# Patient Record
(demographics unavailable — no encounter records)

---

## 2025-04-29 NOTE — IMAGING
[de-identified] : left hand skin intact thumb MP joint + swelling and ecchymosis TTP base of thumb P1 ROM deferred  NVID  Xray left thumb 3 views taken today: extra articular proximal phalanx base fracture

## 2025-04-29 NOTE — HISTORY OF PRESENT ILLNESS
[Dull/Aching] : dull/aching [Localized] : localized [Sharp] : sharp [Student] : Work status: student [de-identified] : 04/29/2025 :RAVEN OBRIEN , a 10 year old female, presents today for left thumb pain injured at school today while playing dodgeball during gym   RHD NKDA No PMHX, No Meds

## 2025-04-29 NOTE — ASSESSMENT
[FreeTextEntry1] : The patient was advised of the diagnosis. The natural history of the pathology was explained in full to the patient in layman's terms. All questions were answered. The risks and benefits of surgical and non-surgical treatment alternatives were explained in full to the patient.  OT rx provided to patient for custom hand based thumb spica brace.  RTO 4 weeks for repeat x-rays.

## 2025-05-28 NOTE — IMAGING
[de-identified] : left hand skin intact thumb MP joint resolved swelling and ecchymosis no TTP base of thumb P1 ROM normal NVID  Xray left thumb 3 views taken today: healed extra articular proximal phalanx base fracture

## 2025-05-28 NOTE — ASSESSMENT
[FreeTextEntry1] : The patient was advised of the diagnosis. The natural history of the pathology was explained in full to the patient in layman's terms. All questions were answered. The risks and benefits of surgical and non-surgical treatment alternatives were explained in full to the patient.  D/C brace  Resume activity  RTO PRN

## 2025-05-28 NOTE — HISTORY OF PRESENT ILLNESS
[Dull/Aching] : dull/aching [Localized] : localized [Sharp] : sharp [Student] : Work status: student [0] : 0 [Intermittent] : intermittent [de-identified] : 5/28/25: Here for follow up, feels better, no pain  04/29/2025 :RAVEN MARCOSLABAILEY , a 10 year old female, presents today for left thumb pain injured at school today while playing dodgeball during gym   RHD NKDA No PMHX, No Meds